# Patient Record
Sex: MALE | Race: WHITE
[De-identification: names, ages, dates, MRNs, and addresses within clinical notes are randomized per-mention and may not be internally consistent; named-entity substitution may affect disease eponyms.]

---

## 2019-06-18 ENCOUNTER — HOSPITAL ENCOUNTER (EMERGENCY)
Dept: HOSPITAL 5 - ED | Age: 34
Discharge: HOME | End: 2019-06-18
Payer: COMMERCIAL

## 2019-06-18 VITALS — SYSTOLIC BLOOD PRESSURE: 116 MMHG | DIASTOLIC BLOOD PRESSURE: 79 MMHG
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
## 2019-06-18 DIAGNOSIS — M79.10: Primary | ICD-10-CM

## 2019-06-18 DIAGNOSIS — Z79.899: ICD-10-CM
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
## 2019-06-18 PROCEDURE — 99282 EMERGENCY DEPT VISIT SF MDM: CPT
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
